# Patient Record
Sex: MALE | Race: ASIAN | NOT HISPANIC OR LATINO | URBAN - METROPOLITAN AREA
[De-identification: names, ages, dates, MRNs, and addresses within clinical notes are randomized per-mention and may not be internally consistent; named-entity substitution may affect disease eponyms.]

---

## 2017-04-20 ENCOUNTER — EMERGENCY (EMERGENCY)
Facility: HOSPITAL | Age: 37
LOS: 1 days | Discharge: HOME | End: 2017-04-20
Attending: EMERGENCY MEDICINE | Admitting: EMERGENCY MEDICINE
Payer: MEDICAID

## 2017-04-20 VITALS
TEMPERATURE: 98 F | OXYGEN SATURATION: 98 % | DIASTOLIC BLOOD PRESSURE: 69 MMHG | RESPIRATION RATE: 17 BRPM | SYSTOLIC BLOOD PRESSURE: 113 MMHG | HEART RATE: 60 BPM

## 2017-04-20 VITALS
TEMPERATURE: 98 F | DIASTOLIC BLOOD PRESSURE: 93 MMHG | SYSTOLIC BLOOD PRESSURE: 131 MMHG | HEART RATE: 73 BPM | OXYGEN SATURATION: 100 % | RESPIRATION RATE: 16 BRPM

## 2017-04-20 PROCEDURE — 99284 EMERGENCY DEPT VISIT MOD MDM: CPT

## 2017-04-20 PROCEDURE — 73140 X-RAY EXAM OF FINGER(S): CPT | Mod: 26,RT

## 2017-04-20 NOTE — ED PROVIDER NOTE - MEDICAL DECISION MAKING DETAILS
36 year old male here for second opinion regarding thumb amputation. No signs of infection. will consult hand surgery.
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
## 2017-04-20 NOTE — ED PROVIDER NOTE - MUSCULOSKELETAL, MLM
right thumb: necrotic tissue on volar surface from tip of finger to IP joint. No fluctuance, redness, drainage. Sensation intact in dorsal surface
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
## 2017-04-20 NOTE — ED PROVIDER NOTE - PROGRESS NOTE DETAILS
AJM: Pt seen by ortho/hand. reccs dc home with wet to dry dressing and follow up in clinic no need for emergent surgical debridement or amputation.